# Patient Record
(demographics unavailable — no encounter records)

---

## 2024-12-29 NOTE — PROCEDURE
[Consent Obtained] : Consent obtained [LGSIL] : LGSIL [de-identified] : Colposcopy scheduled for LGSIL Pap smear. Patient is status post colposcopy last year with negative findings.  After bathing the cervix with a 3% acetic acid the cervix is noted to be stenotic again.  The anterior lip of the cervix was grasped with a single-tooth tenaculum and dilated with an os finder. Despite this the squamocolumnar junction is not visualized.  A small Kevorkian curette is inserted and the endocervical canal is vigorously and methodically curetted.  The endocervical curettings are sent for pathology. The patient is counseled for pelvic rest and to call in 10 days for the results.  We reviewed the management and follow-up. Patient is scheduled for repeat Pap smear in November 2025 pending the results of the current pathology

## 2024-12-29 NOTE — PROCEDURE
[Consent Obtained] : Consent obtained [LGSIL] : LGSIL [de-identified] : Colposcopy scheduled for LGSIL Pap smear. Patient is status post colposcopy last year with negative findings.  After bathing the cervix with a 3% acetic acid the cervix is noted to be stenotic again.  The anterior lip of the cervix was grasped with a single-tooth tenaculum and dilated with an os finder. Despite this the squamocolumnar junction is not visualized.  A small Kevorkian curette is inserted and the endocervical canal is vigorously and methodically curetted.  The endocervical curettings are sent for pathology. The patient is counseled for pelvic rest and to call in 10 days for the results.  We reviewed the management and follow-up. Patient is scheduled for repeat Pap smear in November 2025 pending the results of the current pathology